# Patient Record
Sex: FEMALE | Race: WHITE | NOT HISPANIC OR LATINO | Employment: PART TIME | URBAN - METROPOLITAN AREA
[De-identification: names, ages, dates, MRNs, and addresses within clinical notes are randomized per-mention and may not be internally consistent; named-entity substitution may affect disease eponyms.]

---
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
## 2024-02-19 ENCOUNTER — TELEMEDICINE (OUTPATIENT)
Dept: PEDIATRICS | Facility: CLINIC | Age: 19
End: 2024-02-19
Payer: COMMERCIAL

## 2024-02-19 DIAGNOSIS — F41.1 GENERALIZED ANXIETY DISORDER: Primary | ICD-10-CM

## 2024-02-19 DIAGNOSIS — F41.8 TEST ANXIETY: ICD-10-CM

## 2024-02-19 PROCEDURE — 99214 OFFICE O/P EST MOD 30 MIN: CPT | Performed by: PEDIATRICS

## 2024-02-19 PROCEDURE — 1036F TOBACCO NON-USER: CPT | Performed by: PEDIATRICS

## 2024-02-19 RX ORDER — ESCITALOPRAM OXALATE 10 MG/1
10 TABLET ORAL DAILY
Qty: 30 TABLET | Refills: 0 | Status: SHIPPED | OUTPATIENT
Start: 2024-02-19 | End: 2024-03-13

## 2024-02-19 NOTE — PROGRESS NOTES
Subjective   Patient ID: Sana Castellano is a 18 y.o. female, otherwise healthy, who presents for follow-up for anxiety.  She presents alone virtually.    HPI  Sana Castellano is a 18 y.o. female presenting virtually for a medication follow up for anxiety . She is currently on hydroxyzine 25 mg, 1 tablet every 4 hours as needed for anxiety. The patient reports that the hydroxyzine makes her tired. She has trouble getting to sleep. She has been feeling very anxious recently.  Prior to going to college she did well on Lexapro 10 mg but wanted to wean down prior to college.    She is in her first year of college. She is studying Health Sciences and has a 3.8 GPA.    The MERRY-7 is 7. This result was 3 on 8/7/23. The patient feel that these symptoms are somewhat difficult.  The scoring scale is as follows: 0-5 is minimal anxiety, 6-10 is mild anxiety, 11-15 is moderate anxiety, and 16-21 is severe anxiety. A score greater than 7 is clinically significant.    The PHQ-9A is 3. This result was 1 on 8/7/23. The patient feel that these symptoms are not at all difficult.  The severity measure for depression age 11-17, PHQ-9 modified for adolescence scoring results are as follows: 0-4 = none, 5-9 = mild, 10-14 = moderate, 15-19 = moderately severe, and 20-27 = severe.     Review of Systems  The following history was obtained from patient.   Constitutional: Positive trouble getting to sleep, fatigue. Otherwise denies fever, chills. No difficulties with eating, drinking, urine output, or bowel movements.    Eyes, ENT: Denies eye complaints, ear complaints, nasal congestion, runny nose, or sore throat.   Cardio/Resp: Denies chest pain, palpitations, shortness of breath, wheezing, stridor at rest, cough, working hard to breathe, or breathing fast.   GI/Renal: Denies nausea, vomiting, stomachache, diarrhea, or constipation. Denies dysuria or abnormal urine color or smell.   Musculoskeletal/Skin: Denies muscle or joint complaints. Denies  skin rash.   Neuro/Psych: Positive anxiety. Denies headache, dizziness, confusion, irritability, or fussiness.   Endo/heme/lymph: Denies excessive thirst, excessive sweating, bruising, bleeding, or swollen glands.     Objective   There were no vitals taken for this visit.  BSA: There is no height or weight on file to calculate BSA.  Growth percentiles: No height on file for this encounter. No weight on file for this encounter.     Physical Exam  Limited exam due to Telehealth visit.     Constitutional - Well developed, well nourished, well hydrated and no acute distress  Head and Face - Normal cephalic, atraumatic  Neck - supple, no visibly obvious goiter  Pulmonary - Normal work of breathing.   Cardiovascular - No cyanosis   Musculoskeletal - Normal range of motion  Skin - No significant rash or lesions  Neurologic - Normal  Psychiatric - Awake and alert. Normal mood and affect. Normal parent/child interaction      Problem List Items Addressed This Visit             ICD-10-CM       Mental Health    Anxiety disorder, unspecified - Primary F41.9    Relevant Medications    escitalopram (Lexapro) 10 mg tablet    Test anxiety F41.8     Time in: 9:31 am  Time done: 9:44 am    Assessment/Plan    Sana presents virtually for a medication follow up for anxiety. She is currently on hydroxyzine 25 mg, 1 tablet every 4 hours as needed for anxiety. The patient reports that the hydroxyzine makes her tired. She has trouble getting to sleep. She has been feeling very anxious recently.    I prescribed Lexapro 10 mg. Take 0.5 tablet per day for 1 week and then 1 tablet per day.    PLEASE FOLLOW-UP WITH ME IN 1 MONTH.     Scribe Attestation  By signing my name below, I, Iris Dai, attest that this documentation has been prepared under the direction and in the presence of Dr. Ivone Calle.    Provider Attestation - Scribe documentation  All medical record entries made by the Nitzaibcollin were at my direction and  personally dictated by me. I have reviewed the chart and agree that the record accurately reflects my personal performance of the history, physical exam, discussion and plan.

## 2024-02-19 NOTE — PATIENT INSTRUCTIONS
Sana presents virtually for a medication follow up for anxiety. She is currently on hydroxyzine 25 mg, 1 tablet every 4 hours as needed for anxiety. The patient reports that the hydroxyzine makes her tired. She has trouble getting to sleep. She has been feeling very anxious recently.    I prescribed Lexapro 10 mg. Take 0.5 tablet per day for 1 week and then 1 tablet per day.    PLEASE FOLLOW-UP WITH ME IN 1 MONTH.

## 2024-03-08 ENCOUNTER — TELEPHONE (OUTPATIENT)
Dept: PEDIATRICS | Facility: CLINIC | Age: 19
End: 2024-03-08
Payer: COMMERCIAL

## 2024-03-12 DIAGNOSIS — F41.1 GENERALIZED ANXIETY DISORDER: ICD-10-CM

## 2024-03-13 RX ORDER — ESCITALOPRAM OXALATE 10 MG/1
10 TABLET ORAL DAILY
Qty: 90 TABLET | Refills: 1 | Status: SHIPPED | OUTPATIENT
Start: 2024-03-13

## 2024-08-08 ENCOUNTER — APPOINTMENT (OUTPATIENT)
Dept: PEDIATRICS | Facility: CLINIC | Age: 19
End: 2024-08-08
Payer: COMMERCIAL

## 2024-08-08 VITALS
BODY MASS INDEX: 23.19 KG/M2 | DIASTOLIC BLOOD PRESSURE: 68 MMHG | WEIGHT: 126 LBS | HEART RATE: 66 BPM | SYSTOLIC BLOOD PRESSURE: 110 MMHG | HEIGHT: 62 IN

## 2024-08-08 DIAGNOSIS — F41.1 GENERALIZED ANXIETY DISORDER: ICD-10-CM

## 2024-08-08 DIAGNOSIS — Z91.018 ALLERGY TO TREE NUTS: ICD-10-CM

## 2024-08-08 DIAGNOSIS — Z00.00 WELL ADULT EXAM: Primary | ICD-10-CM

## 2024-08-08 PROCEDURE — 3008F BODY MASS INDEX DOCD: CPT | Performed by: PEDIATRICS

## 2024-08-08 PROCEDURE — 99213 OFFICE O/P EST LOW 20 MIN: CPT | Performed by: PEDIATRICS

## 2024-08-08 PROCEDURE — 99395 PREV VISIT EST AGE 18-39: CPT | Performed by: PEDIATRICS

## 2024-08-08 PROCEDURE — 96127 BRIEF EMOTIONAL/BEHAV ASSMT: CPT | Performed by: PEDIATRICS

## 2024-08-08 PROCEDURE — 1036F TOBACCO NON-USER: CPT | Performed by: PEDIATRICS

## 2024-08-08 RX ORDER — EPINEPHRINE 0.3 MG/.3ML
INJECTION SUBCUTANEOUS
Qty: 2 EACH | Refills: 2 | Status: SHIPPED | OUTPATIENT
Start: 2024-08-08

## 2024-08-08 RX ORDER — ESCITALOPRAM OXALATE 10 MG/1
10 TABLET ORAL DAILY
Qty: 90 TABLET | Refills: 1 | Status: SHIPPED | OUTPATIENT
Start: 2024-08-08

## 2024-08-08 ASSESSMENT — PATIENT HEALTH QUESTIONNAIRE - PHQ9
5. POOR APPETITE OR OVEREATING: NOT AT ALL
4. FEELING TIRED OR HAVING LITTLE ENERGY: NOT AT ALL
7. TROUBLE CONCENTRATING ON THINGS, SUCH AS READING THE NEWSPAPER OR WATCHING TELEVISION: NOT AT ALL
SUM OF ALL RESPONSES TO PHQ9 QUESTIONS 1 & 2: 0
8. MOVING OR SPEAKING SO SLOWLY THAT OTHER PEOPLE COULD HAVE NOTICED. OR THE OPPOSITE - BEING SO FIDGETY OR RESTLESS THAT YOU HAVE BEEN MOVING AROUND A LOT MORE THAN USUAL: NOT AT ALL
3. TROUBLE FALLING OR STAYING ASLEEP OR SLEEPING TOO MUCH: NOT AT ALL
5. POOR APPETITE OR OVEREATING: NOT AT ALL
6. FEELING BAD ABOUT YOURSELF - OR THAT YOU ARE A FAILURE OR HAVE LET YOURSELF OR YOUR FAMILY DOWN: NOT AT ALL
8. MOVING OR SPEAKING SO SLOWLY THAT OTHER PEOPLE COULD HAVE NOTICED. OR THE OPPOSITE, BEING SO FIGETY OR RESTLESS THAT YOU HAVE BEEN MOVING AROUND A LOT MORE THAN USUAL: NOT AT ALL
9. THOUGHTS THAT YOU WOULD BE BETTER OFF DEAD, OR OF HURTING YOURSELF: NOT AT ALL
10. IF YOU CHECKED OFF ANY PROBLEMS, HOW DIFFICULT HAVE THESE PROBLEMS MADE IT FOR YOU TO DO YOUR WORK, TAKE CARE OF THINGS AT HOME, OR GET ALONG WITH OTHER PEOPLE: NOT DIFFICULT AT ALL
4. FEELING TIRED OR HAVING LITTLE ENERGY: NOT AT ALL
1. LITTLE INTEREST OR PLEASURE IN DOING THINGS: NOT AT ALL
3. TROUBLE FALLING OR STAYING ASLEEP: NOT AT ALL
6. FEELING BAD ABOUT YOURSELF - OR THAT YOU ARE A FAILURE OR HAVE LET YOURSELF OR YOUR FAMILY DOWN: NOT AT ALL
2. FEELING DOWN, DEPRESSED OR HOPELESS: NOT AT ALL
10. IF YOU CHECKED OFF ANY PROBLEMS, HOW DIFFICULT HAVE THESE PROBLEMS MADE IT FOR YOU TO DO YOUR WORK, TAKE CARE OF THINGS AT HOME, OR GET ALONG WITH OTHER PEOPLE: NOT DIFFICULT AT ALL
2. FEELING DOWN, DEPRESSED OR HOPELESS: NOT AT ALL
1. LITTLE INTEREST OR PLEASURE IN DOING THINGS: NOT AT ALL
SUM OF ALL RESPONSES TO PHQ QUESTIONS 1-9: 0
9. THOUGHTS THAT YOU WOULD BE BETTER OFF DEAD, OR OF HURTING YOURSELF: NOT AT ALL
7. TROUBLE CONCENTRATING ON THINGS, SUCH AS READING THE NEWSPAPER OR WATCHING TELEVISION: NOT AT ALL

## 2024-08-08 ASSESSMENT — ANXIETY QUESTIONNAIRES
1. FEELING NERVOUS, ANXIOUS, OR ON EDGE: SEVERAL DAYS
6. BECOMING EASILY ANNOYED OR IRRITABLE: NOT AT ALL
3. WORRYING TOO MUCH ABOUT DIFFERENT THINGS: SEVERAL DAYS
IF YOU CHECKED OFF ANY PROBLEMS ON THIS QUESTIONNAIRE, HOW DIFFICULT HAVE THESE PROBLEMS MADE IT FOR YOU TO DO YOUR WORK, TAKE CARE OF THINGS AT HOME, OR GET ALONG WITH OTHER PEOPLE: SOMEWHAT DIFFICULT
7. FEELING AFRAID AS IF SOMETHING AWFUL MIGHT HAPPEN: NOT AT ALL
1. FEELING NERVOUS, ANXIOUS, OR ON EDGE: SEVERAL DAYS
GAD7 TOTAL SCORE: 2
5. BEING SO RESTLESS THAT IT IS HARD TO SIT STILL: NOT AT ALL
2. NOT BEING ABLE TO STOP OR CONTROL WORRYING: NOT AT ALL
5. BEING SO RESTLESS THAT IT IS HARD TO SIT STILL: NOT AT ALL
2. NOT BEING ABLE TO STOP OR CONTROL WORRYING: NOT AT ALL
3. WORRYING TOO MUCH ABOUT DIFFERENT THINGS: SEVERAL DAYS
7. FEELING AFRAID AS IF SOMETHING AWFUL MIGHT HAPPEN: NOT AT ALL
4. TROUBLE RELAXING: NOT AT ALL
4. TROUBLE RELAXING: NOT AT ALL
6. BECOMING EASILY ANNOYED OR IRRITABLE: NOT AT ALL
IF YOU CHECKED OFF ANY PROBLEMS ON THIS QUESTIONNAIRE, HOW DIFFICULT HAVE THESE PROBLEMS MADE IT FOR YOU TO DO YOUR WORK, TAKE CARE OF THINGS AT HOME, OR GET ALONG WITH OTHER PEOPLE: SOMEWHAT DIFFICULT

## 2024-08-08 NOTE — PROGRESS NOTES
HPI:  Sana is a 18 y.o. female who presents today for her Health Maintenance and Supervision Exam. Medical, medication and allergy histories were reviewed.      The PHQ-9A is 0.  The severity measure for depression age 11-17, PHQ-9 modified for adolescence scoring results are as follows: 0-4 = none, 5-9 = mild, 10-14 = moderate, 15-19 = moderately severe, and 20-27 = severe.     The MERRY-7 is 2.  The scoring scale is as follows: 0-5 is minimal anxiety, 6-10 is mild anxiety, 11-15 is moderate anxiety, and 16-21 is severe anxiety. A score greater than 7 is clinically significant.     ASQ was a No for questions 1 through 4 and a No for question 5.     General Health:  Sana is overall in good health.  Concerns today: No    Social and Family History:  At home, there have been no interval changes.  Parental support, work/family balance? YES    Nutrition:  Current Diet: vegetables, fruits, meats, dairy    Food Security:  Within the past 12 months, have you worried that your food would run out before you got money to buy more?   NO  Within the past 12 months, the food you bought just did not last and you did not have money to get more?  NO    Dental Care:  Sana has a dental home? YES  Dental hygiene regularly performed? YES  Fluoridated water: YES    Elimination:  Elimination patterns appropriate:  YES    Sleep:  Sleep patterns appropriate? YES  Sleep problems: NO    Sex specific Data:  Women:  Excessive cramping?  NO    Missed school due to cramping?  NO  Regular menstrual cycle? YES   Days bleeding?  4     Days heavy bleeding? 2  How many tampons or pads used in a 24 hour period at the heaviest? 5    Behavior/Socialization:  Activities with peers? YES  Close friends or family? YES    Education:  [employer] studying Medical Sciences.    Activities:  Physical Activity and sports: YES - Works out at gym    Sports clearance questions:  Have you ever had a concussion?  No  Have you ever fainted?  No  Have  you ever had shortness of breath more than others?  No  Have you ever had rapid or skipped heartbeats?  No  Have you ever had chest pain?  No  Has anyone in your family had a heart attack before the age of 50?  No  Has anyone in your family  without a cause before the age of 50?  No  Has anyone in your family been diagnosed with Mehdi-Parkinson-White syndrome, long QT syndrome or Gail syndrome?  No   Has anyone in your family been diagnosed with unexplained arrhythmias, or cardiomyopathy?  NO    RISK factors:  Dating? NO  Self designated: heterosexual  Self identity: questioning? NO  Sexual activity? YES in the past (has been tested for STIs). Form of birth control: Hormonal birth control  Alcohol?  2-3 mixed drinks per week  Marijuana? NO  Drugs?  NO  Smoking? NO   Vaping? NO    Review of Systems:  Constitutional: Otherwise denies fever, chills, or changes in behavior. No difficulties with sleeping, eating, drinking, urine output, or bowel movements.    Eyes, ENT: Denies eye complaints, ear complaints, nasal congestion, runny nose, or sore throat.   Cardio/Resp: Denies chest pain, palpitations, shortness of breath, wheezing, stridor at rest, cough, working hard to breathe, or breathing fast.   GI/Renal: Denies nausea, vomiting, stomachache, diarrhea, or constipation. Denies dysuria or abnormal urine color or smell.   Musculoskeletal/Skin: Denies muscle or joint complaints. Denies skin rash.   Neuro/Psych: Denies headache, dizziness, or confusion.  Endo/heme/lymph: Denies excessive thirst, excessive sweating, bruising, bleeding, or swollen glands.     Safety Assessment:  Safety topics were reviewed  Seat belt: YES        Driving without distractions and not under the influence:  YES  Refusing to be a passenger if the  is compromised: YES    Exposure to pets: NO    Fire Safety Plan: YES       Bedroom door closed when sleeping:  YES  Smoke detectors: YES       Second hand smoke: No  Fire extinguisher:  YES       Carbon monoxide detectors: YES  Sun safety/ Sunscreen: YES      Water Safety: YES   Heat safety: YES              Firearms in house: NO    Helmet and Mouthguard:  YES          Internet and texting safety: YES     Physical Exam  Vitals reviewed.   Constitutional:       General: female is active.      Appearance: Normal appearance. female is well-developed.   HENT:      Head: Normocephalic.      Right Ear: External ear normal and without deformities. Normal TM.      Left Ear: External ear normal and without deformities. Normal TM.      Nose: Nose normal, patent nares and without deformities.      Mouth/Throat: Normal palate     Mouth: Mucous membranes are moist.      Pharynx: Oropharynx is clear.   Neck:     General: Normal. No lymphadenopathy.     Eyes:      Extraocular Movements: Extraocular movements intact.      Conjunctiva/sclera: Conjunctivae normal.      Pupils: Pupils are equal, round, and reactive to light.   Cardiovascular:      Rate and Rhythm: Normal rate and regular rhythm.      Pulses: Normal pulses.      Heart sounds: Normal heart sounds.   Pulmonary:      Effort: Pulmonary effort is normal.      Breath sounds: Normal breath sounds.   Abdominal:      General: Abdomen is flat.      Palpations: Abdomen is soft.   Musculoskeletal:         General: Normal range of motion, strength and tone.     Cervical back: Normal range of motion and neck supple.   Skin:     General: Skin is warm and dry.      Capillary Refill: Capillary refill takes less than 2 seconds.      Turgor: Normal.   Neurological:      General: No focal deficit present.      Mental Status: female is alert.       Problem List Items Addressed This Visit          Allergies and Adverse Reactions    Allergy to tree nuts    Relevant Medications    EPINEPHrine (Epipen) 0.3 mg/0.3 mL injection syringe       Health Encounters    Well adult exam - Primary       Mental Health    Anxiety disorder, unspecified    Relevant Medications     escitalopram (Lexapro) 10 mg tablet     Time in: 10:16 am  Time done: 10:32 am    Assessment & Plan:   Thank you for involving me in Sana 's care today. Sana is growing well in a warm and nurturing environment. Cleared for sports.     I refilled your medication.    Follow-up in 6 months.    Scribe Attestation  By signing my name below, I, Iris Dai, attest that this documentation has been prepared under the direction and in the presence of Dr. Ivone Calle.    Provider Attestation - Scribe documentation  All medical record entries made by the Scribe were at my direction and personally dictated by me. I have reviewed the chart and agree that the record accurately reflects my personal performance of the history, physical exam, discussion and plan.

## 2024-08-08 NOTE — PATIENT INSTRUCTIONS
Thank you for involving me in Sana 's care today. Sana is growing well in a warm and nurturing environment. Cleared for sports.     I refilled your medication.    Follow-up in 6 months.

## 2024-10-17 ENCOUNTER — APPOINTMENT (OUTPATIENT)
Dept: PEDIATRICS | Facility: CLINIC | Age: 19
End: 2024-10-17
Payer: COMMERCIAL

## 2024-10-17 DIAGNOSIS — N93.8 DYSFUNCTIONAL UTERINE BLEEDING: ICD-10-CM

## 2024-10-17 DIAGNOSIS — G43.B0 OPHTHALMOPLEGIC MIGRAINE, NOT INTRACTABLE: ICD-10-CM

## 2024-10-17 DIAGNOSIS — F41.1 GENERALIZED ANXIETY DISORDER: Primary | ICD-10-CM

## 2024-10-17 PROCEDURE — 99214 OFFICE O/P EST MOD 30 MIN: CPT | Performed by: PEDIATRICS

## 2024-10-17 RX ORDER — SUMATRIPTAN SUCCINATE 25 MG/1
25 TABLET ORAL ONCE AS NEEDED
Qty: 9 TABLET | Refills: 1 | Status: SHIPPED | OUTPATIENT
Start: 2024-10-17

## 2024-10-17 RX ORDER — ESCITALOPRAM OXALATE 10 MG/1
10 TABLET ORAL DAILY
Qty: 90 TABLET | Refills: 1 | Status: SHIPPED | OUTPATIENT
Start: 2024-10-17

## 2024-10-17 RX ORDER — ESCITALOPRAM OXALATE 5 MG/1
5 TABLET ORAL DAILY
Qty: 30 TABLET | Refills: 0 | Status: SHIPPED | OUTPATIENT
Start: 2024-10-17 | End: 2024-11-16

## 2024-10-17 RX ORDER — DROSPIRENONE AND ETHINYL ESTRADIOL 0.03MG-3MG
1 KIT ORAL DAILY
Qty: 84 TABLET | Refills: 1 | Status: SHIPPED | OUTPATIENT
Start: 2024-10-17 | End: 2025-04-15

## 2024-10-17 ASSESSMENT — PATIENT HEALTH QUESTIONNAIRE - PHQ9
6. FEELING BAD ABOUT YOURSELF - OR THAT YOU ARE A FAILURE OR HAVE LET YOURSELF OR YOUR FAMILY DOWN: NOT AT ALL
10. IF YOU CHECKED OFF ANY PROBLEMS, HOW DIFFICULT HAVE THESE PROBLEMS MADE IT FOR YOU TO DO YOUR WORK, TAKE CARE OF THINGS AT HOME, OR GET ALONG WITH OTHER PEOPLE: NOT DIFFICULT AT ALL
8. MOVING OR SPEAKING SO SLOWLY THAT OTHER PEOPLE COULD HAVE NOTICED. OR THE OPPOSITE, BEING SO FIGETY OR RESTLESS THAT YOU HAVE BEEN MOVING AROUND A LOT MORE THAN USUAL: NOT AT ALL
2. FEELING DOWN, DEPRESSED OR HOPELESS: NOT AT ALL
5. POOR APPETITE OR OVEREATING: NOT AT ALL
4. FEELING TIRED OR HAVING LITTLE ENERGY: SEVERAL DAYS
5. POOR APPETITE OR OVEREATING: NOT AT ALL
6. FEELING BAD ABOUT YOURSELF - OR THAT YOU ARE A FAILURE OR HAVE LET YOURSELF OR YOUR FAMILY DOWN: NOT AT ALL
1. LITTLE INTEREST OR PLEASURE IN DOING THINGS: NOT AT ALL
SUM OF ALL RESPONSES TO PHQ9 QUESTIONS 1 & 2: 0
9. THOUGHTS THAT YOU WOULD BE BETTER OFF DEAD, OR OF HURTING YOURSELF: NOT AT ALL
3. TROUBLE FALLING OR STAYING ASLEEP: NOT AT ALL
7. TROUBLE CONCENTRATING ON THINGS, SUCH AS READING THE NEWSPAPER OR WATCHING TELEVISION: NOT AT ALL
7. TROUBLE CONCENTRATING ON THINGS, SUCH AS READING THE NEWSPAPER OR WATCHING TELEVISION: NOT AT ALL
2. FEELING DOWN, DEPRESSED OR HOPELESS: NOT AT ALL
3. TROUBLE FALLING OR STAYING ASLEEP OR SLEEPING TOO MUCH: NOT AT ALL
8. MOVING OR SPEAKING SO SLOWLY THAT OTHER PEOPLE COULD HAVE NOTICED. OR THE OPPOSITE - BEING SO FIDGETY OR RESTLESS THAT YOU HAVE BEEN MOVING AROUND A LOT MORE THAN USUAL: NOT AT ALL
9. THOUGHTS THAT YOU WOULD BE BETTER OFF DEAD, OR OF HURTING YOURSELF: NOT AT ALL
1. LITTLE INTEREST OR PLEASURE IN DOING THINGS: NOT AT ALL
4. FEELING TIRED OR HAVING LITTLE ENERGY: SEVERAL DAYS
10. IF YOU CHECKED OFF ANY PROBLEMS, HOW DIFFICULT HAVE THESE PROBLEMS MADE IT FOR YOU TO DO YOUR WORK, TAKE CARE OF THINGS AT HOME, OR GET ALONG WITH OTHER PEOPLE: NOT DIFFICULT AT ALL
SUM OF ALL RESPONSES TO PHQ QUESTIONS 1-9: 1

## 2024-10-17 ASSESSMENT — ANXIETY QUESTIONNAIRES
3. WORRYING TOO MUCH ABOUT DIFFERENT THINGS: SEVERAL DAYS
IF YOU CHECKED OFF ANY PROBLEMS ON THIS QUESTIONNAIRE, HOW DIFFICULT HAVE THESE PROBLEMS MADE IT FOR YOU TO DO YOUR WORK, TAKE CARE OF THINGS AT HOME, OR GET ALONG WITH OTHER PEOPLE: SOMEWHAT DIFFICULT
3. WORRYING TOO MUCH ABOUT DIFFERENT THINGS: SEVERAL DAYS
IF YOU CHECKED OFF ANY PROBLEMS ON THIS QUESTIONNAIRE, HOW DIFFICULT HAVE THESE PROBLEMS MADE IT FOR YOU TO DO YOUR WORK, TAKE CARE OF THINGS AT HOME, OR GET ALONG WITH OTHER PEOPLE: SOMEWHAT DIFFICULT
4. TROUBLE RELAXING: NOT AT ALL
5. BEING SO RESTLESS THAT IT IS HARD TO SIT STILL: SEVERAL DAYS
2. NOT BEING ABLE TO STOP OR CONTROL WORRYING: SEVERAL DAYS
GAD7 TOTAL SCORE: 5
2. NOT BEING ABLE TO STOP OR CONTROL WORRYING: SEVERAL DAYS
5. BEING SO RESTLESS THAT IT IS HARD TO SIT STILL: SEVERAL DAYS
6. BECOMING EASILY ANNOYED OR IRRITABLE: NOT AT ALL
1. FEELING NERVOUS, ANXIOUS, OR ON EDGE: MORE THAN HALF THE DAYS
6. BECOMING EASILY ANNOYED OR IRRITABLE: NOT AT ALL
4. TROUBLE RELAXING: NOT AT ALL
7. FEELING AFRAID AS IF SOMETHING AWFUL MIGHT HAPPEN: NOT AT ALL
7. FEELING AFRAID AS IF SOMETHING AWFUL MIGHT HAPPEN: NOT AT ALL
1. FEELING NERVOUS, ANXIOUS, OR ON EDGE: MORE THAN HALF THE DAYS

## 2024-10-17 NOTE — PROGRESS NOTES
Subjective   Patient ID: Sana Castellano is a 19 y.o. female, otherwise healthy, who presents virtually for a medication follow-up.  She presents virtually alone.    HPI  Sana Castellano is a 19 y.o. female presenting virtually for a medication follow up for anxiety . She is currently on Lexapro 10 mg, 1 tablet per day. She is doing well in school but is feeling more stressed. She reports a new onset of ocular migraines and has had them five times since school started. She is unable to see for 20-30 minutes during the start of the migraine and then the headache continues. She feels like there is blurriness in both eyes. She has been drinking a lot of caffeine.    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    ASQ was a No for questions 1 through 4 and a No for question 5.     The PHQ-9A is 1. This result was 0 on 8/8/24.  The severity measure for depression age 11-17, PHQ-9 modified for adolescence scoring results are as follows: 0-4 = none, 5-9 = mild, 10-14 = moderate, 15-19 = moderately severe, and 20-27 = severe.    The MERRY-7 is 5. This result was 2 on 8/8/24.  The scoring scale is as follows: 0-5 is minimal anxiety, 6-10 is mild anxiety, 11-15 is moderate anxiety, and 16-21 is severe anxiety. A score greater than 7 is clinically significant.     Review of Systems  The following history was obtained from patient.   Constitutional: Otherwise denies fever, chills, or changes in behavior. No difficulties with sleeping, eating, drinking, urine output, or bowel movements.    Eyes, ENT: Positive blurred vision bilaterally. Denies ear complaints, nasal congestion, runny nose, or sore throat.   Cardio/Resp: Denies chest pain, palpitations, shortness of breath, wheezing, stridor at rest, cough, working hard to breathe, or breathing fast.   GI/Renal: Denies nausea, vomiting, stomachache, diarrhea, or constipation. Denies dysuria or abnormal urine color or smell.   Musculoskeletal/Skin:  Denies muscle or joint complaints. Denies skin rash.   Neuro/Psych: Positive anxiety, ocular migraines. Denies dizziness, confusion, irritability, or fussiness.   Endo/heme/lymph: Denies excessive thirst, excessive sweating, bruising, bleeding, or swollen glands.     Current Outpatient Medications   Medication Sig Dispense Refill    drospirenone-ethinyl estradioL (Ca, Ocella) 3-0.03 mg tablet Take 1 tablet by mouth once daily.      EPINEPHrine (Epipen) 0.3 mg/0.3 mL injection syringe Inject into upper leg. Call 911 after use. 2 each 2    escitalopram (Lexapro) 10 mg tablet Take 1 tablet (10 mg) by mouth once daily. 90 tablet 1    hydrOXYzine HCL (Atarax) 25 mg tablet Take 1 tablet (25 mg) by mouth every 4 hours if needed for anxiety. 30 tablet 3     No current facility-administered medications for this visit.        Allergies   Allergen Reactions    Tree Nut Shortness of breath    Pistachio Nut Hives        No family history on file.     Objective   There were no vitals taken for this visit.  BSA: There is no height or weight on file to calculate BSA.  Growth percentiles: Facility age limit for growth %janeen is 20 years. Facility age limit for growth %janeen is 20 years.     Physical Exam  Limited exam due to Telehealth visit.     Constitutional - Well developed, well nourished, well hydrated and no acute distress  Head and Face - Normal cephalic, atraumatic  Neck - supple, no visibly obvious goiter  Pulmonary - Normal work of breathing.   Cardiovascular - No cyanosis   Musculoskeletal - Normal range of motion  Skin - No significant rash or lesions  Neurologic - Normal  Psychiatric - Awake and alert. Normal mood and affect. Normal parent/child interaction      Problem List Items Addressed This Visit             ICD-10-CM       Genitourinary and Reproductive    Dysfunctional uterine bleeding N93.8    Relevant Medications    drospirenone-ethinyl estradioL (Ca, Ocella) 3-0.03 mg tablet       Mental Health     Anxiety disorder, unspecified - Primary F41.9    Relevant Medications    escitalopram (Lexapro) 10 mg tablet    escitalopram (Lexapro) 5 mg tablet       Neuro    Ophthalmoplegic migraine, not intractable G43.B0    Relevant Medications    SUMAtriptan (Imitrex) 25 mg tablet     Time in: 12:51 pm  Time done: 1:06 pm    Assessment/Plan    Sana Castellano is a 19 y.o. female presenting virtually for a medication follow up for anxiety. She is currently on Lexapro 10 mg, 1 tablet per day. She is doing well in school but is feeling more stressed. She reports a new onset of ocular migraines and has had them five times since school started. She is unable to see for 20-30 minutes during the start of the migraine and then the headache continues. She feels like there is blurriness in both eyes. She has been drinking a lot of caffeine.    I increased your dose of Lexapro to 15 mg, 1 tablet per day.    PLEASE FOLLOW-UP WITH ME IN 1 MONTH.     Migraine/ Headache Instructions:  #1 Make sure you are drinking enough fluid.  If your are eating well, you should drink at least half your weight (in pounds) in ounces of non-caffeinated fluid including water.  If you are not eating well, please make sure you drink the same amount of fluid but a good amount of that fluid should be Gatorade, Powerade, electrolyte solution or Pedialyte.  #2 Make sure you are taking magnesium and riboflavin supplementation daily.  Please follow the following recommendations based on weight:  70 kg (154 pounds) or above = 400 mg each of magnesium and riboflavin (B2) per day  53 kg (116 pounds) = 300 mg each of magnesium and riboflavin (B2) per day.  35 kg (77 pounds) or lower = 200 mg of each of magnesium and riboflavin (B2) per day  # 3 I will give you a headache diary to be filled out.  You can wait until you have a headache and immediately write down the preceding 3 meals in detail.  Write down what you are doing at the time of the headache, how much stress you  are under, we are you were, for example under strobe lights, and how much sleep you had the night before.  #4 Common triggers for migraines including dehydration, sulfates, nitrites, strobe lights, stress, hard cheeses, caffeine sometimes, wine, lack of sleep, monosodium glutamate or MSG and potentially allergy triggers.  #5 I have written for Imitrex 25 mg tablets and your child can have one tablet at the onset of headache with 1-2 Aleve over-the-counter.  They must immediately laid down in a dark room for 15-20 minutes.    Scribe Attestation  By signing my name below, I, Iris Dai, attest that this documentation has been prepared under the direction and in the presence of Dr. Ivone Calle.    Provider Attestation - Scribe documentation  All medical record entries made by the Scribe were at my direction and personally dictated by me. I have reviewed the chart and agree that the record accurately reflects my personal performance of the history, physical exam, discussion and plan.

## 2024-10-17 NOTE — PATIENT INSTRUCTIONS
Sana Castellano is a 19 y.o. female presenting virtually for a medication follow up for anxiety. She is currently on Lexapro 10 mg, 1 tablet per day. She is doing well in school but is feeling more stressed. She reports a new onset of ocular migraines and has had them five times since school started. She is unable to see for 20-30 minutes during the start of the migraine and then the headache continues. She feels like there is blurriness in both eyes. She has been drinking a lot of caffeine.    I increased your dose of Lexapro to 15 mg, 1 tablet per day.    PLEASE FOLLOW-UP WITH ME IN 1 MONTH.     Migraine/ Headache Instructions:  #1 Make sure you are drinking enough fluid.  If your are eating well, you should drink at least half your weight (in pounds) in ounces of non-caffeinated fluid including water.  If you are not eating well, please make sure you drink the same amount of fluid but a good amount of that fluid should be Gatorade, Powerade, electrolyte solution or Pedialyte.  #2 Make sure you are taking magnesium and riboflavin supplementation daily.  Please follow the following recommendations based on weight:  70 kg (154 pounds) or above = 400 mg each of magnesium and riboflavin (B2) per day  53 kg (116 pounds) = 300 mg each of magnesium and riboflavin (B2) per day.  35 kg (77 pounds) or lower = 200 mg of each of magnesium and riboflavin (B2) per day  # 3 I will give you a headache diary to be filled out.  You can wait until you have a headache and immediately write down the preceding 3 meals in detail.  Write down what you are doing at the time of the headache, how much stress you are under, we are you were, for example under strobe lights, and how much sleep you had the night before.  #4 Common triggers for migraines including dehydration, sulfates, nitrites, strobe lights, stress, hard cheeses, caffeine sometimes, wine, lack of sleep, monosodium glutamate or MSG and potentially allergy triggers.  #5 I have  written for Imitrex 25 mg tablets and your child can have one tablet at the onset of headache with 1-2 Aleve over-the-counter.  They must immediately laid down in a dark room for 15-20 minutes.

## 2024-12-04 ENCOUNTER — TELEMEDICINE (OUTPATIENT)
Dept: PEDIATRICS | Facility: CLINIC | Age: 19
End: 2024-12-04
Payer: COMMERCIAL

## 2024-12-04 DIAGNOSIS — F41.1 GENERALIZED ANXIETY DISORDER: Primary | ICD-10-CM

## 2024-12-04 PROCEDURE — 1036F TOBACCO NON-USER: CPT | Performed by: PEDIATRICS

## 2024-12-04 PROCEDURE — 99214 OFFICE O/P EST MOD 30 MIN: CPT | Performed by: PEDIATRICS

## 2024-12-04 RX ORDER — ESCITALOPRAM OXALATE 10 MG/1
10 TABLET ORAL DAILY
Qty: 30 TABLET | Refills: 5 | Status: SHIPPED | OUTPATIENT
Start: 2024-12-04 | End: 2025-06-02

## 2024-12-04 RX ORDER — ESCITALOPRAM OXALATE 5 MG/1
5 TABLET ORAL DAILY
Qty: 30 TABLET | Refills: 0 | Status: SHIPPED | OUTPATIENT
Start: 2024-12-04 | End: 2025-01-03

## 2024-12-04 RX ORDER — BUSPIRONE HYDROCHLORIDE 5 MG/1
5 TABLET ORAL 2 TIMES DAILY
Qty: 60 TABLET | Refills: 0 | Status: SHIPPED | OUTPATIENT
Start: 2024-12-04 | End: 2025-01-03

## 2024-12-04 ASSESSMENT — ANXIETY QUESTIONNAIRES
2. NOT BEING ABLE TO STOP OR CONTROL WORRYING: SEVERAL DAYS
IF YOU CHECKED OFF ANY PROBLEMS ON THIS QUESTIONNAIRE, HOW DIFFICULT HAVE THESE PROBLEMS MADE IT FOR YOU TO DO YOUR WORK, TAKE CARE OF THINGS AT HOME, OR GET ALONG WITH OTHER PEOPLE: SOMEWHAT DIFFICULT
5. BEING SO RESTLESS THAT IT IS HARD TO SIT STILL: MORE THAN HALF THE DAYS
4. TROUBLE RELAXING: NOT AT ALL
3. WORRYING TOO MUCH ABOUT DIFFERENT THINGS: SEVERAL DAYS
GAD7 TOTAL SCORE: 6
1. FEELING NERVOUS, ANXIOUS, OR ON EDGE: MORE THAN HALF THE DAYS
7. FEELING AFRAID AS IF SOMETHING AWFUL MIGHT HAPPEN: NOT AT ALL
6. BECOMING EASILY ANNOYED OR IRRITABLE: NOT AT ALL

## 2024-12-04 ASSESSMENT — PATIENT HEALTH QUESTIONNAIRE - PHQ9
1. LITTLE INTEREST OR PLEASURE IN DOING THINGS: NOT AT ALL
SUM OF ALL RESPONSES TO PHQ9 QUESTIONS 1 AND 2: 1
SUM OF ALL RESPONSES TO PHQ QUESTIONS 1-9: 4
9. THOUGHTS THAT YOU WOULD BE BETTER OFF DEAD, OR OF HURTING YOURSELF: NOT AT ALL
4. FEELING TIRED OR HAVING LITTLE ENERGY: SEVERAL DAYS
5. POOR APPETITE OR OVEREATING: NOT AT ALL
6. FEELING BAD ABOUT YOURSELF - OR THAT YOU ARE A FAILURE OR HAVE LET YOURSELF OR YOUR FAMILY DOWN: NOT AT ALL
8. MOVING OR SPEAKING SO SLOWLY THAT OTHER PEOPLE COULD HAVE NOTICED. OR THE OPPOSITE, BEING SO FIGETY OR RESTLESS THAT YOU HAVE BEEN MOVING AROUND A LOT MORE THAN USUAL: MORE THAN HALF THE DAYS
3. TROUBLE FALLING OR STAYING ASLEEP OR SLEEPING TOO MUCH: NOT AT ALL
10. IF YOU CHECKED OFF ANY PROBLEMS, HOW DIFFICULT HAVE THESE PROBLEMS MADE IT FOR YOU TO DO YOUR WORK, TAKE CARE OF THINGS AT HOME, OR GET ALONG WITH OTHER PEOPLE: SOMEWHAT DIFFICULT
7. TROUBLE CONCENTRATING ON THINGS, SUCH AS READING THE NEWSPAPER OR WATCHING TELEVISION: NOT AT ALL
2. FEELING DOWN, DEPRESSED OR HOPELESS: SEVERAL DAYS

## 2024-12-04 NOTE — PATIENT INSTRUCTIONS
Sana presents today for follow-up anxiety.  Despite increasing her Lexapro from 10 mg to 15 mg, her anxiety continues without any benefit.  If anything her scores were the same if not slightly worse.  Instead of going to 20 mg of Lexapro, I would like to try adding BuSpar 5 mg twice a day.  She can start with 1 tablet in the morning for 4 days and if she is not having any side effects she can go to BuSpar 5 mg twice a day in addition to her Lexapro 15 mg.    Please follow-up in 1 month.  The goal then if the BuSpar is helping her would be to decrease her Lexapro back down to 10 mg.

## 2024-12-04 NOTE — PROGRESS NOTES
Subjective   Patient ID: Sana Castellano is a 19 y.o. female, otherwise healthy, who presents for No chief complaint on file..    HPI  Sana Castellano is a 19 y.o. female presenting for a medication follow up for anxiety virtually.  Last visit she found that she was having more anxiety.  Lexapro 10 mg was keeping her anxiety under control for a long time but had had some more recent anxiety events.  We increased her to 15 mg Lexapro in the past month.  She feels that this made no difference.  Her PHQ slightly worsened and her MERRY-7 was essentially the same.  Right now she is heading towards finals in the next week or so.  She is otherwise eating well drinking well with normal output.  She is not having trouble sleeping.  She denies any other stressors other than school.  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  She originally wanted to go to med school but now she is thinking about being a drug rep/medical device rep.  She is minoring in business.    The PHQ-9A is 4. This result was 1 on 11/17/2024. The severity measure for depression age 11-17, PHQ-9 modified for adolescence scoring results are as follows: 0-4 = none, 5-9 = mild, 10-14 = moderate, 15-19 = moderately severe, and 20-27 = severe.    The MERRY-7 is 6. This result was 5 on 10/17/2024. The scoring scale is as follows: 0-5 is minimal anxiety, 6-10 is mild anxiety, 11-15 is moderate anxiety, and 16-21 is severe anxiety. A score greater than 7 is clinically significant.    Review of Systems  The following history was obtained from the patient.   Constitutional: Otherwise denies fever, chills, or changes in behavior. No difficulties with sleeping, eating, drinking, urine output, or bowel movements.    Eyes, ENT: Denies eye complaints, ear complaints, nasal congestion, runny nose, or sore throat.   Cardio/Resp: Denies chest pain, palpitations, shortness of breath, wheezing, stridor at rest, cough, working hard to breathe, or breathing  fast.   GI/Renal: Denies nausea, vomiting, stomachache, diarrhea, or constipation. Denies dysuria or abnormal urine color or smell.   Musculoskeletal/Skin: Denies muscle or joint complaints. Denies skin rash.   Neuro/Psych: Positive anxiety.  Denies headache, dizziness, confusion, irritability, or fussiness.   Endo/heme/lymph: Denies excessive thirst, excessive sweating, bruising, bleeding, or swollen glands.     Current Outpatient Medications   Medication Sig Dispense Refill    busPIRone (Buspar) 5 mg tablet Take 1 tablet (5 mg) by mouth 2 times a day. 60 tablet 0    drospirenone-ethinyl estradioL (Ca, Ocella) 3-0.03 mg tablet Take 1 tablet by mouth once daily. 84 tablet 1    EPINEPHrine (Epipen) 0.3 mg/0.3 mL injection syringe Inject into upper leg. Call 911 after use. 2 each 2    escitalopram (Lexapro) 10 mg tablet Take 1 tablet (10 mg) by mouth once daily. 30 tablet 5    escitalopram (Lexapro) 5 mg tablet Take 1 tablet (5 mg) by mouth once daily. For a total of 15 mg daily 30 tablet 0    hydrOXYzine HCL (Atarax) 25 mg tablet Take 1 tablet (25 mg) by mouth every 4 hours if needed for anxiety. 30 tablet 3    SUMAtriptan (Imitrex) 25 mg tablet Take 1 tablet (25 mg) by mouth 1 time if needed for migraine for up to 9 doses. May repeat dose once in 2 hours if no relief.  Do not exceed 2 doses in 24 hours. 9 tablet 1     No current facility-administered medications for this visit.        Allergies   Allergen Reactions    Tree Nut Shortness of breath    Pistachio Nut Hives        No family history on file.     Objective   There were no vitals taken for this visit.  BSA: There is no height or weight on file to calculate BSA.  Growth percentiles: Facility age limit for growth %janeen is 20 years. Facility age limit for growth %janeen is 20 years.     Physical Exam  Limited exam due to Telehealth visit.     Constitutional - Well developed, well nourished, well hydrated and no acute distress  Head and Face - Normal  cephalic, atraumatic  Neck - supple, no visibly obvious goiter  Pulmonary - Normal work of breathing.   Cardiovascular - No cyanosis   Musculoskeletal - Normal range of motion  Skin - No significant rash or lesions  Neurologic - Normal  Psychiatric - Awake and alert. Normal mood and affect. Normal parent/child interaction       Problem List Items Addressed This Visit             ICD-10-CM    Anxiety disorder, unspecified - Primary F41.9    Relevant Medications    busPIRone (Buspar) 5 mg tablet    escitalopram (Lexapro) 5 mg tablet    escitalopram (Lexapro) 10 mg tablet       Assessment/Plan    Sana presents today for follow-up anxiety.  Despite increasing her Lexapro from 10 mg to 15 mg, her anxiety continues without any benefit.  If anything her scores were the same if not slightly worse.  Instead of going to 20 mg of Lexapro, I would like to try adding BuSpar 5 mg twice a day.  She can start with 1 tablet in the morning for 4 days and if she is not having any side effects she can go to BuSpar 5 mg twice a day in addition to her Lexapro 15 mg.    Please follow-up in 1 month.  The goal then if the BuSpar is helping her would be to decrease her Lexapro back down to 10 mg.

## 2025-01-02 ENCOUNTER — APPOINTMENT (OUTPATIENT)
Dept: PEDIATRICS | Facility: CLINIC | Age: 20
End: 2025-01-02
Payer: COMMERCIAL

## 2025-01-02 DIAGNOSIS — F41.1 GENERALIZED ANXIETY DISORDER: Primary | ICD-10-CM

## 2025-01-02 DIAGNOSIS — F41.8 TEST ANXIETY: ICD-10-CM

## 2025-01-02 PROCEDURE — 1036F TOBACCO NON-USER: CPT | Performed by: PEDIATRICS

## 2025-01-02 PROCEDURE — 99214 OFFICE O/P EST MOD 30 MIN: CPT | Performed by: PEDIATRICS

## 2025-01-02 PROCEDURE — 96127 BRIEF EMOTIONAL/BEHAV ASSMT: CPT | Performed by: PEDIATRICS

## 2025-01-02 RX ORDER — BUPROPION HYDROCHLORIDE 75 MG/1
75 TABLET ORAL 2 TIMES DAILY
Qty: 60 TABLET | Refills: 0 | Status: SHIPPED | OUTPATIENT
Start: 2025-01-02 | End: 2025-02-01

## 2025-01-02 RX ORDER — ESCITALOPRAM OXALATE 10 MG/1
10 TABLET ORAL DAILY
Qty: 30 TABLET | Refills: 1 | Status: SHIPPED | OUTPATIENT
Start: 2025-01-02 | End: 2025-03-03

## 2025-01-02 RX ORDER — HYDROXYZINE HYDROCHLORIDE 25 MG/1
25 TABLET, FILM COATED ORAL EVERY 4 HOURS PRN
Qty: 30 TABLET | Refills: 3 | Status: SHIPPED | OUTPATIENT
Start: 2025-01-02 | End: 2025-07-01

## 2025-01-02 ASSESSMENT — ANXIETY QUESTIONNAIRES
1. FEELING NERVOUS, ANXIOUS, OR ON EDGE: MORE THAN HALF THE DAYS
6. BECOMING EASILY ANNOYED OR IRRITABLE: NOT AT ALL
IF YOU CHECKED OFF ANY PROBLEMS ON THIS QUESTIONNAIRE, HOW DIFFICULT HAVE THESE PROBLEMS MADE IT FOR YOU TO DO YOUR WORK, TAKE CARE OF THINGS AT HOME, OR GET ALONG WITH OTHER PEOPLE: SOMEWHAT DIFFICULT
4. TROUBLE RELAXING: NOT AT ALL
3. WORRYING TOO MUCH ABOUT DIFFERENT THINGS: SEVERAL DAYS
6. BECOMING EASILY ANNOYED OR IRRITABLE: NOT AT ALL
GAD7 TOTAL SCORE: 6
7. FEELING AFRAID AS IF SOMETHING AWFUL MIGHT HAPPEN: NOT AT ALL
7. FEELING AFRAID AS IF SOMETHING AWFUL MIGHT HAPPEN: NOT AT ALL
2. NOT BEING ABLE TO STOP OR CONTROL WORRYING: SEVERAL DAYS
4. TROUBLE RELAXING: NOT AT ALL
1. FEELING NERVOUS, ANXIOUS, OR ON EDGE: MORE THAN HALF THE DAYS
5. BEING SO RESTLESS THAT IT IS HARD TO SIT STILL: MORE THAN HALF THE DAYS
2. NOT BEING ABLE TO STOP OR CONTROL WORRYING: SEVERAL DAYS
IF YOU CHECKED OFF ANY PROBLEMS ON THIS QUESTIONNAIRE, HOW DIFFICULT HAVE THESE PROBLEMS MADE IT FOR YOU TO DO YOUR WORK, TAKE CARE OF THINGS AT HOME, OR GET ALONG WITH OTHER PEOPLE: SOMEWHAT DIFFICULT
3. WORRYING TOO MUCH ABOUT DIFFERENT THINGS: SEVERAL DAYS
5. BEING SO RESTLESS THAT IT IS HARD TO SIT STILL: MORE THAN HALF THE DAYS

## 2025-01-02 ASSESSMENT — PATIENT HEALTH QUESTIONNAIRE - PHQ9
7. TROUBLE CONCENTRATING ON THINGS, SUCH AS READING THE NEWSPAPER OR WATCHING TELEVISION: NOT AT ALL
9. THOUGHTS THAT YOU WOULD BE BETTER OFF DEAD, OR OF HURTING YOURSELF: NOT AT ALL
6. FEELING BAD ABOUT YOURSELF - OR THAT YOU ARE A FAILURE OR HAVE LET YOURSELF OR YOUR FAMILY DOWN: SEVERAL DAYS
1. LITTLE INTEREST OR PLEASURE IN DOING THINGS: NOT AT ALL
3. TROUBLE FALLING OR STAYING ASLEEP OR SLEEPING TOO MUCH: NOT AT ALL
8. MOVING OR SPEAKING SO SLOWLY THAT OTHER PEOPLE COULD HAVE NOTICED. OR THE OPPOSITE, BEING SO FIGETY OR RESTLESS THAT YOU HAVE BEEN MOVING AROUND A LOT MORE THAN USUAL: SEVERAL DAYS
5. POOR APPETITE OR OVEREATING: NOT AT ALL
2. FEELING DOWN, DEPRESSED OR HOPELESS: NOT AT ALL
4. FEELING TIRED OR HAVING LITTLE ENERGY: SEVERAL DAYS
10. IF YOU CHECKED OFF ANY PROBLEMS, HOW DIFFICULT HAVE THESE PROBLEMS MADE IT FOR YOU TO DO YOUR WORK, TAKE CARE OF THINGS AT HOME, OR GET ALONG WITH OTHER PEOPLE: SOMEWHAT DIFFICULT
SUM OF ALL RESPONSES TO PHQ QUESTIONS 1-9: 3
SUM OF ALL RESPONSES TO PHQ9 QUESTIONS 1 AND 2: 0

## 2025-01-02 NOTE — PATIENT INSTRUCTIONS
Sana Castellano is a 19 y.o. female presenting virtually for a medication follow up for anxiety. She is currently on Lexapro 15 mg, 1 tablet per day; and BuSpar 5 mg twice per day. The patient does not like the way the BuSpar makes her feel. She has been feeling more anxious even though she has been on Winter Break. She states she passed all her grades, but did not do as well as she wanted. She received a C in chemistry. She denies trouble sleeping, but reports having panic attacks during which it is difficult to breath and she starts to worry. It has been occurring more than usual. It occurs once per week at the most.     I refilled her Lexapro 15 mg for 2 months.    I prescribed short acting Wellbutrin 75 mg twice per day.     Please stop taking the BuSpar.    PLEASE FOLLOW-UP WITH ME IN 1 MONTH.

## 2025-01-02 NOTE — PROGRESS NOTES
Subjective   Patient ID: Sana Castellano is a 19 y.o. female, otherwise healthy, who presents virtually for a medication follow-up.  She presents today virtually alone.    HPI  Sana Castellano is a 19 y.o. female presenting virtually for a medication follow up for anxiety . She is currently on Lexapro 15 mg, 1 tablet per day; and BuSpar 5 mg twice per day. The patient does not like the way the BuSpar makes her feel. She has been feeling more anxious even though she has been on Winter Break. She states she passed all her classes at [employer], but did not do as well as she wanted. She received a C in chemistry. She denies trouble sleeping, but reports having panic attacks during which it is difficult to breath and she starts to worry. It has been occurring more than usual. It occurs once per week at the most.     ASQ was a No for questions 1 through 4 and a No for question 5.     The PHQ-9A is 3. This result was 4 on 12/4/24. The patient feel that these symptoms are somewhat difficult.  The severity measure for depression age 11-17, PHQ-9 modified for adolescence scoring results are as follows: 0-4 = none, 5-9 = mild, 10-14 = moderate, 15-19 = moderately severe, and 20-27 = severe.    The MERRY-7 is 6. This result was 6 on 12/4/24.  The scoring scale is as follows: 0-5 is minimal anxiety, 6-10 is mild anxiety, 11-15 is moderate anxiety, and 16-21 is severe anxiety. A score greater than 7 is clinically significant.     Review of Systems  The following history was obtained from patient.   Constitutional: Otherwise denies fever, chills, or changes in behavior. No difficulties with sleeping, eating, drinking, urine output, or bowel movements.    Eyes, ENT: Denies eye complaints, ear complaints, nasal congestion, runny nose, or sore throat.   Cardio/Resp: Denies chest pain, palpitations, shortness of breath, wheezing, stridor at rest, cough, working hard to breathe, or breathing fast.   GI/Renal: Denies nausea, vomiting, stomachache,  diarrhea, or constipation. Denies dysuria or abnormal urine color or smell.   Musculoskeletal/Skin: Denies muscle or joint complaints. Denies skin rash.   Neuro/Psych: Positive anxiety, panic attacks. Denies headache, dizziness, confusion, irritability, or fussiness.   Endo/heme/lymph: Denies excessive thirst, excessive sweating, bruising, bleeding, or swollen glands.     Current Outpatient Medications   Medication Sig Dispense Refill    buPROPion (Wellbutrin) 75 mg tablet Take 1 tablet (75 mg) by mouth 2 times a day. 60 tablet 0    drospirenone-ethinyl estradioL (Ca, Ocella) 3-0.03 mg tablet Take 1 tablet by mouth once daily. 84 tablet 1    EPINEPHrine (Epipen) 0.3 mg/0.3 mL injection syringe Inject into upper leg. Call 911 after use. 2 each 2    escitalopram (Lexapro) 10 mg tablet Take 1 tablet (10 mg) by mouth once daily. 30 tablet 1    hydrOXYzine HCL (Atarax) 25 mg tablet Take 1 tablet (25 mg) by mouth every 4 hours if needed for anxiety. 30 tablet 3    SUMAtriptan (Imitrex) 25 mg tablet Take 1 tablet (25 mg) by mouth 1 time if needed for migraine for up to 9 doses. May repeat dose once in 2 hours if no relief.  Do not exceed 2 doses in 24 hours. 9 tablet 1     No current facility-administered medications for this visit.        Allergies   Allergen Reactions    Tree Nut Shortness of breath    Pistachio Nut Hives        No family history on file.     Objective   There were no vitals taken for this visit.  BSA: There is no height or weight on file to calculate BSA.  Growth percentiles: Facility age limit for growth %janeen is 20 years. Facility age limit for growth %janeen is 20 years.     Physical Exam  Limited exam due to Telehealth visit.     Constitutional - Well developed, well nourished, well hydrated and no acute distress  Head and Face - Normal cephalic, atraumatic  Neck - supple, no visibly obvious goiter  Pulmonary - Normal work of breathing.   Cardiovascular - No cyanosis   Musculoskeletal - Normal  range of motion  Skin - No significant rash or lesions  Neurologic - Normal  Psychiatric - Awake and alert. Normal mood and affect. Normal parent/child interaction      Problem List Items Addressed This Visit             ICD-10-CM       Mental Health    Anxiety disorder, unspecified - Primary F41.9    Relevant Medications    buPROPion (Wellbutrin) 75 mg tablet    escitalopram (Lexapro) 10 mg tablet    Test anxiety F41.8    Relevant Medications    hydrOXYzine HCL (Atarax) 25 mg tablet     Time in: 12:23 pm  Time done: 12:39 pm    Assessment/Plan    Sana Castellano is a 19 y.o. female presenting virtually for a medication follow up for anxiety. She is currently on Lexapro 15 mg, 1 tablet per day; and BuSpar 5 mg twice per day. The patient does not like the way the BuSpar makes her feel. She has been feeling more anxious even though she has been on Winter Break. She states she passed all her classes, but did not do as well as she wanted. She received a C in chemistry. She denies trouble sleeping, but reports having panic attacks during which it is difficult to breath and she starts to worry. It has been occurring more than usual. It occurs once per week at the most.     I refilled her Lexapro 15 mg for 2 months.    I prescribed short acting Wellbutrin 75 mg twice per day.     Please stop taking the BuSpar.    PLEASE FOLLOW-UP WITH ME IN 1 MONTH.     Scribe Attestation  By signing my name below, I, Iris Dai, attest that this documentation has been prepared under the direction and in the presence of Dr. Ivone Calle.    Provider Attestation - Scribe documentation  All medical record entries made by the Scribe were at my direction and personally dictated by me. I have reviewed the chart and agree that the record accurately reflects my personal performance of the history, physical exam, discussion and plan.

## 2025-01-09 DIAGNOSIS — N93.8 DYSFUNCTIONAL UTERINE BLEEDING: ICD-10-CM

## 2025-01-09 DIAGNOSIS — F41.1 GENERALIZED ANXIETY DISORDER: ICD-10-CM

## 2025-01-09 RX ORDER — BUPROPION HYDROCHLORIDE 75 MG/1
75 TABLET ORAL 2 TIMES DAILY
Qty: 60 TABLET | Refills: 0 | Status: SHIPPED | OUTPATIENT
Start: 2025-01-09 | End: 2025-02-08

## 2025-01-09 RX ORDER — ESCITALOPRAM OXALATE 10 MG/1
10 TABLET ORAL DAILY
Qty: 30 TABLET | Refills: 1 | Status: SHIPPED | OUTPATIENT
Start: 2025-01-09 | End: 2025-03-10

## 2025-01-09 RX ORDER — ESCITALOPRAM OXALATE 5 MG/1
5 TABLET ORAL DAILY
COMMUNITY

## 2025-01-09 RX ORDER — DROSPIRENONE AND ETHINYL ESTRADIOL 0.03MG-3MG
1 KIT ORAL DAILY
Qty: 84 TABLET | Refills: 1 | Status: SHIPPED | OUTPATIENT
Start: 2025-01-09 | End: 2025-07-08

## 2025-02-01 DIAGNOSIS — F41.1 GENERALIZED ANXIETY DISORDER: ICD-10-CM

## 2025-02-03 RX ORDER — ESCITALOPRAM OXALATE 10 MG/1
10 TABLET ORAL DAILY
Qty: 90 TABLET | Refills: 1 | Status: SHIPPED | OUTPATIENT
Start: 2025-02-03

## 2025-02-03 RX ORDER — BUPROPION HYDROCHLORIDE 75 MG/1
75 TABLET ORAL 2 TIMES DAILY
Qty: 180 TABLET | Refills: 1 | Status: SHIPPED | OUTPATIENT
Start: 2025-02-03

## 2025-07-14 ENCOUNTER — TELEPHONE (OUTPATIENT)
Dept: PEDIATRICS | Facility: CLINIC | Age: 20
End: 2025-07-14
Payer: COMMERCIAL

## 2025-07-14 DIAGNOSIS — N93.8 DYSFUNCTIONAL UTERINE BLEEDING: ICD-10-CM

## 2025-07-14 RX ORDER — DROSPIRENONE AND ETHINYL ESTRADIOL 0.03MG-3MG
1 KIT ORAL DAILY
Qty: 84 TABLET | Refills: 0 | Status: SHIPPED | OUTPATIENT
Start: 2025-07-14 | End: 2026-01-10

## 2025-08-08 ENCOUNTER — APPOINTMENT (OUTPATIENT)
Dept: PEDIATRICS | Facility: CLINIC | Age: 20
End: 2025-08-08
Payer: COMMERCIAL

## 2025-08-08 VITALS
OXYGEN SATURATION: 99 % | HEART RATE: 98 BPM | DIASTOLIC BLOOD PRESSURE: 76 MMHG | WEIGHT: 133.8 LBS | BODY MASS INDEX: 25.26 KG/M2 | SYSTOLIC BLOOD PRESSURE: 122 MMHG | HEIGHT: 61 IN | TEMPERATURE: 97.6 F

## 2025-08-08 DIAGNOSIS — L21.9 SEBORRHEA: ICD-10-CM

## 2025-08-08 DIAGNOSIS — F41.1 GENERALIZED ANXIETY DISORDER: ICD-10-CM

## 2025-08-08 DIAGNOSIS — Z00.00 WELL ADULT EXAM: Primary | ICD-10-CM

## 2025-08-08 PROCEDURE — 96127 BRIEF EMOTIONAL/BEHAV ASSMT: CPT | Performed by: PEDIATRICS

## 2025-08-08 PROCEDURE — 1036F TOBACCO NON-USER: CPT | Performed by: PEDIATRICS

## 2025-08-08 PROCEDURE — 99395 PREV VISIT EST AGE 18-39: CPT | Performed by: PEDIATRICS

## 2025-08-08 PROCEDURE — 3008F BODY MASS INDEX DOCD: CPT | Performed by: PEDIATRICS

## 2025-08-08 PROCEDURE — 99213 OFFICE O/P EST LOW 20 MIN: CPT | Performed by: PEDIATRICS

## 2025-08-08 RX ORDER — KETOCONAZOLE 20 MG/G
CREAM TOPICAL 2 TIMES DAILY
Qty: 30 G | Refills: 2 | Status: SHIPPED | OUTPATIENT
Start: 2025-08-08

## 2025-08-08 RX ORDER — ESCITALOPRAM OXALATE 5 MG/1
5 TABLET ORAL DAILY
Qty: 90 TABLET | Refills: 1 | Status: SHIPPED | OUTPATIENT
Start: 2025-08-08 | End: 2026-02-04

## 2025-08-08 RX ORDER — ESCITALOPRAM OXALATE 10 MG/1
10 TABLET ORAL DAILY
Qty: 90 TABLET | Refills: 1 | Status: SHIPPED | OUTPATIENT
Start: 2025-08-08

## 2025-08-08 RX ORDER — BUPROPION HYDROCHLORIDE 75 MG/1
75 TABLET ORAL 2 TIMES DAILY
Qty: 180 TABLET | Refills: 1 | Status: SHIPPED | OUTPATIENT
Start: 2025-08-08

## 2025-08-08 ASSESSMENT — ANXIETY QUESTIONNAIRES
6. BECOMING EASILY ANNOYED OR IRRITABLE: NOT AT ALL
4. TROUBLE RELAXING: NOT AT ALL
3. WORRYING TOO MUCH ABOUT DIFFERENT THINGS: NOT AT ALL
7. FEELING AFRAID AS IF SOMETHING AWFUL MIGHT HAPPEN: NOT AT ALL
6. BECOMING EASILY ANNOYED OR IRRITABLE: NOT AT ALL
2. NOT BEING ABLE TO STOP OR CONTROL WORRYING: SEVERAL DAYS
4. TROUBLE RELAXING: NOT AT ALL
1. FEELING NERVOUS, ANXIOUS, OR ON EDGE: SEVERAL DAYS
IF YOU CHECKED OFF ANY PROBLEMS ON THIS QUESTIONNAIRE, HOW DIFFICULT HAVE THESE PROBLEMS MADE IT FOR YOU TO DO YOUR WORK, TAKE CARE OF THINGS AT HOME, OR GET ALONG WITH OTHER PEOPLE: SOMEWHAT DIFFICULT
1. FEELING NERVOUS, ANXIOUS, OR ON EDGE: SEVERAL DAYS
3. WORRYING TOO MUCH ABOUT DIFFERENT THINGS: NOT AT ALL
7. FEELING AFRAID AS IF SOMETHING AWFUL MIGHT HAPPEN: NOT AT ALL
GAD7 TOTAL SCORE: 2
2. NOT BEING ABLE TO STOP OR CONTROL WORRYING: SEVERAL DAYS
IF YOU CHECKED OFF ANY PROBLEMS ON THIS QUESTIONNAIRE, HOW DIFFICULT HAVE THESE PROBLEMS MADE IT FOR YOU TO DO YOUR WORK, TAKE CARE OF THINGS AT HOME, OR GET ALONG WITH OTHER PEOPLE: SOMEWHAT DIFFICULT
5. BEING SO RESTLESS THAT IT IS HARD TO SIT STILL: NOT AT ALL
5. BEING SO RESTLESS THAT IT IS HARD TO SIT STILL: NOT AT ALL

## 2025-08-08 ASSESSMENT — PATIENT HEALTH QUESTIONNAIRE - PHQ9
1. LITTLE INTEREST OR PLEASURE IN DOING THINGS: NOT AT ALL
6. FEELING BAD ABOUT YOURSELF - OR THAT YOU ARE A FAILURE OR HAVE LET YOURSELF OR YOUR FAMILY DOWN: NOT AT ALL
5. POOR APPETITE OR OVEREATING: NOT AT ALL
8. MOVING OR SPEAKING SO SLOWLY THAT OTHER PEOPLE COULD HAVE NOTICED. OR THE OPPOSITE, BEING SO FIGETY OR RESTLESS THAT YOU HAVE BEEN MOVING AROUND A LOT MORE THAN USUAL: NOT AT ALL
4. FEELING TIRED OR HAVING LITTLE ENERGY: SEVERAL DAYS
5. POOR APPETITE OR OVEREATING: NOT AT ALL
9. THOUGHTS THAT YOU WOULD BE BETTER OFF DEAD, OR OF HURTING YOURSELF: NOT AT ALL
3. TROUBLE FALLING OR STAYING ASLEEP: NOT AT ALL
SUM OF ALL RESPONSES TO PHQ9 QUESTIONS 1 & 2: 0
7. TROUBLE CONCENTRATING ON THINGS, SUCH AS READING THE NEWSPAPER OR WATCHING TELEVISION: NOT AT ALL
3. TROUBLE FALLING OR STAYING ASLEEP OR SLEEPING TOO MUCH: NOT AT ALL
10. IF YOU CHECKED OFF ANY PROBLEMS, HOW DIFFICULT HAVE THESE PROBLEMS MADE IT FOR YOU TO DO YOUR WORK, TAKE CARE OF THINGS AT HOME, OR GET ALONG WITH OTHER PEOPLE: NOT DIFFICULT AT ALL
8. MOVING OR SPEAKING SO SLOWLY THAT OTHER PEOPLE COULD HAVE NOTICED. OR THE OPPOSITE - BEING SO FIDGETY OR RESTLESS THAT YOU HAVE BEEN MOVING AROUND A LOT MORE THAN USUAL: NOT AT ALL
2. FEELING DOWN, DEPRESSED OR HOPELESS: NOT AT ALL
6. FEELING BAD ABOUT YOURSELF - OR THAT YOU ARE A FAILURE OR HAVE LET YOURSELF OR YOUR FAMILY DOWN: NOT AT ALL
10. IF YOU CHECKED OFF ANY PROBLEMS, HOW DIFFICULT HAVE THESE PROBLEMS MADE IT FOR YOU TO DO YOUR WORK, TAKE CARE OF THINGS AT HOME, OR GET ALONG WITH OTHER PEOPLE: NOT DIFFICULT AT ALL
9. THOUGHTS THAT YOU WOULD BE BETTER OFF DEAD, OR OF HURTING YOURSELF: NOT AT ALL
4. FEELING TIRED OR HAVING LITTLE ENERGY: SEVERAL DAYS
2. FEELING DOWN, DEPRESSED OR HOPELESS: NOT AT ALL
7. TROUBLE CONCENTRATING ON THINGS, SUCH AS READING THE NEWSPAPER OR WATCHING TELEVISION: NOT AT ALL
SUM OF ALL RESPONSES TO PHQ QUESTIONS 1-9: 1
1. LITTLE INTEREST OR PLEASURE IN DOING THINGS: NOT AT ALL

## 2025-08-08 NOTE — PATIENT INSTRUCTIONS
Sana, thank you for involving me in your care today. 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 She has a history of anxiety and is currently on Lexapro 15 mg per day and Wellbutrin 75 mg twice per day. She is doing well.    Your psych meds are good for 6 months.    I prescribed ketoconazole for your skin.

## 2025-08-08 NOTE — PROGRESS NOTES
HPI:  Sana is a 19 y.o. female who presents today for her Health Maintenance and Supervision Exam. Medical, medication and allergy histories were reviewed. She has a history of anxiety and is currently on Lexapro 15 mg per day and Wellbutrin 75 mg twice per day. She is doing well.     Glasses recently checked.    ASQ was a No for questions 1 through 4 and a No for question 5.     The PHQ-9A is 1. This result was 3 on 1/2/25.  The severity measure for depression age 11-17, PHQ-9 modified for adolescence scoring results are as follows: 0-4 = none, 5-9 = mild, 10-14 = moderate, 15-19 = moderately severe, and 20-27 = severe.     The MERRY-7 is 2. This result was 6 on 1/2/25.  The scoring scale is as follows: 0-5 is minimal anxiety, 6-10 is mild anxiety, 11-15 is moderate anxiety, and 16-21 is severe anxiety. A score greater than 7 is clinically significant.     General Health:  Sana is overall in good health.  Concerns today: No    Social and Family History:  At home, there have been no interval changes.  Parental support, work/family balance? YES    Nutrition:  Current Diet: vegetables, fruits, meats. Takes vitamins.    Food Security:  Within the past 12 months, have you worried that your food would run out before you got money to buy more?   NO  Within the past 12 months, the food you bought just did not last and you did not have money to get more?  NO    Dental Care:  Sana has a dental home? YES  Dental hygiene regularly performed? YES  Fluoridated water: YES    Current Medications[1]     Allergies[2]     Family History[3]     Elimination:  Elimination patterns appropriate:  YES    Sleep:  Sleep patterns appropriate? YES  Sleep problems: NO    Sex specific Data:  Women:  Excessive cramping?  NO    Missed school due to cramping?  NO   Regular menstrual cycle? YES   Days bleeding?  4-5     Days heavy bleeding? 2-3  How many tampons or pads used in a 24 hour period at the heaviest? 4-5  On birth  control.    Behavior/Socialization:  Activities with peers? YES  Close friends or family? 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  Doing well.    Activities:  Physical Activity and sports: YES - cardio and lifting    RISK factors:  Dating? YES, for 8 months.  Self designated: heterosexual  Self identity: questioning? NO  Sexual activity? YES. Number of partners: 3. Form of birth control: Oral.  She has been tested for STIs.  Alcohol?  2 seltzers every other week.  Marijuana? NO  Drugs?  NO  Smoking? NO  Vaping? NO    Review of Systems:  Constitutional: Otherwise denies fever, chills, or changes in behavior. No difficulties with sleeping, eating, drinking, urine output, or bowel movements.    Eyes, ENT: Denies eye complaints, ear complaints, nasal congestion, runny nose, or sore throat.   Cardio/Resp: Denies chest pain, palpitations, shortness of breath, wheezing, stridor at rest, cough, working hard to breathe, or breathing fast.   GI/Renal: Denies nausea, vomiting, stomachache, diarrhea, or constipation. Denies dysuria or abnormal urine color or smell.   Musculoskeletal/Skin: Positive dry skin. Denies muscle or joint complaints.  Neuro/Psych: Positive anxiety. Denies headache, dizziness, or confusion.  Endo/heme/lymph: Denies excessive thirst, excessive sweating, bruising, bleeding, or swollen glands.     Safety Assessment:  Safety topics were reviewed  Seat belt: YES        Driving without distractions and not under the influence:  YES  Refusing to be a passenger if the  is compromised: YES    Exposure to pets: NO    Fire Safety Plan: YES       Bedroom door closed when sleeping:  YES  Smoke detectors: YES       Second hand smoke: No  Fire extinguisher: YES       Carbon monoxide detectors: YES  Sun safety/ Sunscreen: YES      Water Safety: YES   Heat safety: YES              Firearms in house: NO    Helmet and Mouthguard:  YES            Internet and texting safety: YES     Physical Exam  Vitals reviewed.   Constitutional:       General: female is active.      Appearance: Normal appearance. female is well-developed.   HENT:      Head: Normocephalic.      Right Ear: External ear normal and without deformities. Normal TM.      Left Ear: External ear normal and without deformities. Normal TM.      Nose: Nose normal, patent nares and without deformities.      Mouth/Throat: Normal palate     Mouth: Mucous membranes are moist.      Pharynx: Oropharynx is clear.   Neck:     General: Normal. No lymphadenopathy.     Eyes:      Extraocular Movements: Extraocular movements intact.      Conjunctiva/sclera: Conjunctivae normal.      Pupils: Pupils are equal, round, and reactive to light.   Cardiovascular:      Rate and Rhythm: Normal rate and regular rhythm.      Pulses: Normal pulses.      Heart sounds: Normal heart sounds.   Pulmonary:      Effort: Pulmonary effort is normal.      Breath sounds: Normal breath sounds.   Abdominal:      General: Abdomen is flat.      Palpations: Abdomen is soft.   Musculoskeletal:         General: Normal range of motion, strength and tone.     Cervical back: Normal range of motion and neck supple.   Skin:     General: Dry erythematous macular lesions, mostly on the nasolabial fold and chin.     Capillary Refill: Capillary refill takes less than 2 seconds.      Turgor: Normal.   Neurological:      General: No focal deficit present.      Mental Status: female is alert.       Diagnoses and all orders for this visit:  Well adult exam  Seborrhea  -     ketoconazole (NIZOral) 2 % cream; Apply topically 2 times a day.  Generalized anxiety disorder  -     buPROPion (Wellbutrin) 75 mg tablet; Take 1 tablet (75 mg) by mouth 2 times a day.  -     escitalopram (Lexapro) 10 mg tablet; Take 1 tablet (10 mg) by mouth once daily.  -     escitalopram (Lexapro) 5 mg tablet; Take 1 tablet (5 mg) by mouth once daily. For a total of 15 mg with  10 mg tab    Time in: 10:12 am  Time done: 10:30 am    Assessment & Plan:  Sana, thank you for involving me in your care today. 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 She has a history of anxiety and is currently on Lexapro 15 mg per day and Wellbutrin 75 mg twice per day. She is doing well.    Your psych meds are good for 6 months.    I prescribed ketoconazole for your skin.    Scribe Attestation  By signing my name below, I, Iris Dai, attest that this documentation has been prepared under the direction and in the presence of Dr. Ivone Calle.    Provider Attestation - Scribe documentation  All medical record entries made by the Scribe were at my direction and personally dictated by me. I have reviewed the chart and agree that the record accurately reflects my personal performance of the history, physical exam, discussion and plan.          [1]   Current Outpatient Medications   Medication Sig Dispense Refill    buPROPion (Wellbutrin) 75 mg tablet TAKE 1 TABLET BY MOUTH TWICE A  tablet 1    drospirenone-ethinyl estradioL (Ca, [place]cella) 3-0.03 mg tablet Take 1 tablet by mouth once daily. 84 tablet 0    EPINEPHrine (Epipen) 0.3 mg/0.3 mL injection syringe Inject into upper leg. Call 911 after use. 2 each 2    escitalopram (Lexapro) 10 mg tablet TAKE 1 TABLET BY MOUTH EVERY DAY 90 tablet 1    escitalopram (Lexapro) 5 mg tablet Take 1 tablet (5 mg) by mouth once daily.      hydrOXYzine HCL (Atarax) 25 mg tablet Take 1 tablet (25 mg) by mouth every 4 hours if needed for anxiety. 30 tablet 3    SUMAtriptan (Imitrex) 25 mg tablet Take 1 tablet (25 mg) by mouth 1 time if needed for migraine for up to 9 doses. May repeat dose once in 2 hours if no relief.  Do not exceed 2 doses in 24 hours. 9 tablet 1     No current facility-administered medications for this visit.   [2]   Allergies  Allergen Reactions    Tree Nut Shortness of breath    Pistachio Nut Hives   [3] No family  history on file.

## 2025-08-15 DIAGNOSIS — Z91.018 ALLERGY TO TREE NUTS: ICD-10-CM

## 2025-08-15 RX ORDER — EPINEPHRINE 0.3 MG/.3ML
INJECTION SUBCUTANEOUS
Qty: 2 EACH | Refills: 2 | Status: SHIPPED | OUTPATIENT
Start: 2025-08-15